# Patient Record
Sex: MALE | Race: AMERICAN INDIAN OR ALASKA NATIVE | URBAN - METROPOLITAN AREA
[De-identification: names, ages, dates, MRNs, and addresses within clinical notes are randomized per-mention and may not be internally consistent; named-entity substitution may affect disease eponyms.]

---
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
## 2022-09-16 NOTE — PROCEDURE: MIPS QUALITY
The rash under your armpit is due to a yeast infection.   Keep the area clean and dry.   Use the cream twice a day for up to 2 weeks.   If the rash does not improve, follow up with your primary care provider or return to urgent care.       
Additional Notes: Patient states they are on no medication
Quality 226: Preventive Care And Screening: Tobacco Use: Screening And Cessation Intervention: Patient screened for tobacco use and is an ex/non-smoker
Quality 130: Documentation Of Current Medications In The Medical Record: Current Medications Documented
Detail Level: Detailed